# Patient Record
Sex: MALE | Race: WHITE | NOT HISPANIC OR LATINO | URBAN - METROPOLITAN AREA
[De-identification: names, ages, dates, MRNs, and addresses within clinical notes are randomized per-mention and may not be internally consistent; named-entity substitution may affect disease eponyms.]

---

## 2018-12-27 ENCOUNTER — EMERGENCY (EMERGENCY)
Facility: HOSPITAL | Age: 53
LOS: 1 days | End: 2018-12-27
Attending: EMERGENCY MEDICINE
Payer: COMMERCIAL

## 2018-12-27 PROCEDURE — 92950 HEART/LUNG RESUSCITATION CPR: CPT

## 2018-12-27 PROCEDURE — 99285 EMERGENCY DEPT VISIT HI MDM: CPT | Mod: 25

## 2018-12-27 PROCEDURE — G0390: CPT
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
## 2018-12-27 NOTE — ED PROVIDER NOTE - PHYSICAL EXAMINATION
Gen: unresponsive  HEENT: boggy, crepitus right head with active bleeding  EYEs: pupils non reactive, 4mm  CV: no pulse, no heart sounds  Pulm: no spontaneous breaths  Abd: soft  Ext: no deformities
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
## 2018-12-27 NOTE — ED PROVIDER NOTE - CRITICAL CARE PROVIDED
consultation with other physicians/direct patient care (not related to procedure)/documentation/additional history taking/interpretation of diagnostic studies/consult w/ pt's family directly relating to pts condition
